# Patient Record
Sex: FEMALE | Race: BLACK OR AFRICAN AMERICAN
[De-identification: names, ages, dates, MRNs, and addresses within clinical notes are randomized per-mention and may not be internally consistent; named-entity substitution may affect disease eponyms.]

---

## 2018-08-24 ENCOUNTER — HOSPITAL ENCOUNTER (OUTPATIENT)
Dept: HOSPITAL 62 - RAD | Age: 48
End: 2018-08-24
Attending: INTERNAL MEDICINE
Payer: COMMERCIAL

## 2018-08-24 DIAGNOSIS — M54.16: Primary | ICD-10-CM

## 2018-08-24 DIAGNOSIS — M54.12: ICD-10-CM

## 2018-08-24 PROCEDURE — 72141 MRI NECK SPINE W/O DYE: CPT

## 2018-08-24 PROCEDURE — 72148 MRI LUMBAR SPINE W/O DYE: CPT

## 2018-08-25 NOTE — RADIOLOGY REPORT (SQ)
EXAM DESCRIPTION:  MRI CERVICAL SPINE WITHOUT



COMPLETED DATE/TIME:  8/24/2018 8:56 pm



REASON FOR STUDY:  M54.12 RADICULOPATHY, CERVICAL REGION M54.16 RADICULOPATHY, LUMBAR REGION M54.16  
RADICULOPATHY, LUMBAR REGION M54.12  RADICULOPATHY, CERVICAL REGION



COMPARISON:  None.



TECHNIQUE:  Sagittal and Axial imaging includes T1, T2, STIR and gradient echo sequences.



LIMITATIONS:  None.



FINDINGS:  ALIGNMENT: Normal.

VERTEBRAE: Intact.

BONE MARROW: Normal. No marrow replacement or reactive changes.

HARDWARE: None in the spine.

CORD AND BASE OF BRAIN: Normal in size and signal intensity.

SOFT TISSUES: No soft tissue masses.

C1-C2: No significant spinal stenosis.

C2-C3: No significant spinal stenosis or exit foraminal stenosis.

C3-C4: No significant spinal stenosis or exit foraminal stenosis.

C4-C5: Mild uncovertebral spurs with slight foraminal narrowing.

C5-C6: Disc osteophyte complex effaces the anterior CSF space and contacts the cord without mass effe
ct or significant central canal compromise.  Prominent uncovertebral spurring on the left.  Less pron
ounced right spurring.  Grossly moderate left foraminal narrowing.

C6-C7: No significant spinal stenosis or exit foraminal stenosis.

C7-T1: No significant spinal stenosis or exit foraminal stenosis.

UPPER THORACIC: Incompletely imaged. No significant spinal stenosis or exit foraminal stenosis.

OTHER: No other significant finding.



IMPRESSION:  1. Disc disease, most pronounced at C5-6.



TECHNICAL DOCUMENTATION:  JOB ID:  [id number]

 2011 [employer]- All Rights Reserved



Reading location - IP/workstation name: [email or web address]

## 2018-08-25 NOTE — RADIOLOGY REPORT (SQ)
EXAM DESCRIPTION:  MRI LUMBAR SPINE WITHOUT



COMPLETED DATE/TIME:  8/24/2018 8:56 pm



REASON FOR STUDY:  M54.12 RADICULOPATHY, CERVICAL REGION M54.16 RADICULOPATHY, LUMBAR REGION M54.16  
RADICULOPATHY, LUMBAR REGION M54.12  RADICULOPATHY, CERVICAL REGION



COMPARISON:  None.



TECHNIQUE:  Sagittal and Axial imaging includes T1, T2, STIR and gradient echo sequences. Coronal T2/
HASTE imaging.



LIMITATIONS:  None.



FINDINGS:  VISUALIZED UPPER ABDOMEN:  Limited evaluation. No acute or suspicious findings suggested.

SEGMENTATION: No transitional anatomy. The lowest well-developed disc space is labeled L5-S1.

ALIGNMENT: Anatomic.

VERTEBRAE: Intact.

BONE MARROW: Normal. No marrow replacement or reactive changes.

DISC SIGNAL: Disc disease at L5-S1, diminished signal with further findings described below.

POSTERIOR ELEMENTS:  No pars defect.  No bulky degenerative facet overgrowth.

HARDWARE: None in the spine.

CORD AND CONUS: Normal in size and signal intensity. Conus at the appropriate level.

SOFT TISSUES: No aortic aneurysm seen. No bulky retroperitoneal adenopathy or mass. No paraspinal mas
s or fluid.

L1-L2: No significant spinal stenosis or exit foraminal stenosis.

L2-L3: No significant spinal stenosis or exit foraminal stenosis.

L3-L4: No significant spinal stenosis or exit foraminal stenosis.

L4-L5: No significant spinal stenosis or exit foraminal stenosis.

L5-S1: Small central annular fissure with mild posterior protrusion.  No nerve root impingement or hi
gh-grade central stenosis.  Neural foramina mildly narrowed.

LOWER THORACIC: Incompletely imaged.  No stenosis seen.

SACRUM: Visualized upper sacrum intact.

OTHER: No other significant findings.



IMPRESSION:  1. Mild spondylosis, L5-S1 disc disease without alok nerve root impingement or high-gra
de stenosis.



TECHNICAL DOCUMENTATION:  JOB ID:  [id number]

 [employer]- All Rights Reserved



Reading location - IP/workstation name: [email or web address]

## 2018-11-14 ENCOUNTER — HOSPITAL ENCOUNTER (OUTPATIENT)
Dept: HOSPITAL 62 - RAD | Age: 48
End: 2018-11-14
Payer: COMMERCIAL

## 2018-11-14 DIAGNOSIS — M48.061: Primary | ICD-10-CM

## 2018-11-14 DIAGNOSIS — M54.2: ICD-10-CM

## 2018-11-14 PROCEDURE — 72120 X-RAY BEND ONLY L-S SPINE: CPT

## 2018-11-14 PROCEDURE — 72040 X-RAY EXAM NECK SPINE 2-3 VW: CPT

## 2018-11-14 NOTE — RADIOLOGY REPORT (SQ)
EXAM DESCRIPTION:  CERV SP 3 VIEW OR LESS



COMPLETED DATE/TIME:  11/14/2018 7:05 pm



REASON FOR STUDY:  M48.061 SPINAL STENOSIS, LUMBAR REGION WITHOUT NEUROGENIC YAW M54.2 CERVI M48.06
1  SPINAL STENOSIS, LUMBAR REGION WITHOUT NEUROGENIC CL M54.2  CERVICALGIA



COMPARISON:  None.



NUMBER OF VIEWS:  Three views.



TECHNIQUE:  An AP image was obtained.  Lateral views were obtained in neutral, flexion, and extension
.



LIMITATIONS:  None.



FINDINGS:  MINERALIZATION: Normal.

ALIGNMENT: Anatomic.

VERTEBRAE: Vertebral bodies of normal height.

DISCS: Mild disc narrowing at C5-6.

HARDWARE: None in the spine.

SOFT TISSUES: No masses or calcifications. Lung apices clear.

OTHER: No other significant finding.



IMPRESSION:  Mild C5 disc space narrowing.  No instability between flexion and extension.



TECHNICAL DOCUMENTATION:  JOB ID:  [id number]

 2011 [employer]- All Rights Reserved



Reading location - IP/workstation name: [email or web address]

## 2018-11-15 NOTE — RADIOLOGY REPORT (SQ)
EXAM DESCRIPTION:  L SPINE FLEX/EXT ONLY



COMPLETED DATE/TIME:  11/14/2018 7:05 pm



REASON FOR STUDY:  M48.061 SPINAL STENOSIS, LUMBAR REGION WITHOUT NEUROGENIC YAW M54.2CERVIC M48.06
1  SPINAL STENOSIS, LUMBAR REGION WITHOUT NEUROGENIC CL M54.2  CERVICALGIA



COMPARISON:  11/3/2014 plain films

MRI lumbar spine 8/24/2018



NUMBER OF VIEWS:  Four view.



TECHNIQUE:  AP and lateral lumbar spine, lateral flexion, lateral extension lumbar spine images



LIMITATIONS:  None.



FINDINGS:  MINERALIZATION: Normal.

SEGMENTATION: Short ribs/ long transverse processes at the T12 level

ALIGNMENT: Normal.

FLEXION/EXTENSION: No instability.

VERTEBRAE: Maintained height. No fracture or worrisome bone lesion.

DISCS: Disc space loss of height at L4-5 and L5-S1

POSTERIOR ELEMENTS: Pedicles and facets are intact.  No pars defect or posterior arch defects.

HARDWARE: None in the spine.

OTHER: No other significant finding.



IMPRESSION:  Disc space loss of height at L4-5 and L5-S1

NO INSTABILITY ON FLEXION/EXTENSION.



TECHNICAL DOCUMENTATION:  JOB ID:  [id number]

 2011 [employer]- All Rights Reserved



Reading location - IP/workstation name: [email or web address]